# Patient Record
Sex: FEMALE | Race: WHITE
[De-identification: names, ages, dates, MRNs, and addresses within clinical notes are randomized per-mention and may not be internally consistent; named-entity substitution may affect disease eponyms.]

---

## 2020-07-13 ENCOUNTER — HOSPITAL ENCOUNTER (OUTPATIENT)
Dept: HOSPITAL 100 - PSN | Age: 60
Discharge: HOME | End: 2020-07-13
Payer: COMMERCIAL

## 2020-07-13 VITALS — OXYGEN SATURATION: 96 % | HEART RATE: 62 BPM

## 2020-07-13 DIAGNOSIS — R06.00: Primary | ICD-10-CM

## 2020-07-13 PROCEDURE — 94618 PULMONARY STRESS TESTING: CPT

## 2020-07-17 ENCOUNTER — HOSPITAL ENCOUNTER (OUTPATIENT)
Dept: HOSPITAL 100 - CT | Age: 60
Discharge: HOME | End: 2020-07-17
Payer: COMMERCIAL

## 2020-07-17 DIAGNOSIS — R22.0: Primary | ICD-10-CM

## 2020-07-17 LAB
CREATININE FINGERSTICK: 0.9 MG/DL (ref 0.55–1.02)
EGFR FINGERSTICK: > 60 ML/MIN (ref 60–?)

## 2020-07-17 PROCEDURE — 71260 CT THORAX DX C+: CPT

## 2023-12-06 NOTE — PROGRESS NOTES
Cardiology Subsequent Encounter Clinic Note  Name: Bobbi Tinsley  MRN: [id number]  : 1960  CC: [employer]    Active Issues:  Bobbi Tinsley is a 63 y.o. female with a medical history of ACC/AHA stage C HFpEF, hypertension here for the following complaints:     Problem #1 ACC/AHA stage C HFpEF  -Currently on Lasix 40 mg daily     Problem #2 palpitations  -Event monitor was negative for any significant arrhythmias. Continues to have occasional palpitations.     Problem 3 hypertension  -Blood pressure better controlled following initiation of the valsartan. Lisinopril was discontinued due to cough.     Problem #4 atypical chest pain  Stress test -ve 2022.     Currently notes occasional shortness of breath with activity. Denies any orthopnea/PND/does have occasional lower extremity edema. Continues to have episodes of chest discomfort/palpitations associated with dizziness short lasting mostly at rest.  These are mostly described as fairly infrequent and usually when the weather changes.  She describes around 5-10 episodes in the past 2 weeks    Exercise nuclear stress test 2022:  1. Normal myocardial perfusion study without evidence of ischemia or  prior infarction.  2. The left ventricle is normal in size.  3. Normal LV wall motion with an LV EF estimated at greater than 65%.    Echocardiogram 2021:  1. The left ventricular systolic function is normal with a 55-60% estimated ejection fraction.  2. Spectral Doppler shows an impaired relaxation pattern of left ventricular diastolic filling.  3. There is mild aortic valve regurgitation.      Past Medical History  Past Medical History:   Diagnosis Date    Chronic obstructive pulmonary disease, unspecified (CMS/[facility]) 2020    COPD (chronic obstructive pulmonary disease)    Essential (primary) hypertension 2022    Hypertension    Gastro-esophageal reflux disease without esophagitis 2020    GERD (gastroesophageal reflux disease)     Other postherpetic nervous system involvement 07/31/2020    Postherpetic neuralgia    Personal history of other specified conditions 07/10/2020    History of dyspnea    Unspecified asthma, uncomplicated 05/18/2020    Asthma       Past Surgical History  Past Surgical History:   Procedure Laterality Date    OTHER SURGICAL HISTORY  05/18/2020    Cholecystectomy    OTHER SURGICAL HISTORY  05/18/2020    Rectal surgery    OTHER SURGICAL HISTORY  05/18/2020    Colonoscopy    OTHER SURGICAL HISTORY  05/18/2020    Sigmoidectomy       Medications  Current Outpatient Medications on File Prior to Visit   Medication Sig Dispense Refill    cyclobenzaprine (Flexeril) 10 mg tablet Take 1 tablet (10 mg) by mouth once daily as needed.      furosemide (Lasix) 40 mg tablet Take 1 tablet (40 mg) by mouth once daily.      gabapentin (Neurontin) 300 mg capsule Take 1 capsule (300 mg) by mouth twice a day.      hydrOXYzine HCL (Atarax) 25 mg tablet Take 1 tablet (25 mg) by mouth 3 times a day as needed.      L. acidophilus/Bifid. animalis 32 billion cell capsule Take by mouth.      metoprolol succinate XL (Toprol-XL) 50 mg 24 hr tablet Take 1 tablet (50 mg) by mouth once daily.      omeprazole (PriLOSEC) 20 mg DR capsule Take 1 capsule (20 mg) by mouth once daily.      potassium chloride CR 10 mEq ER tablet Take 2 tablets (20 mEq) by mouth.      valsartan (Diovan) 160 mg tablet Take 1 tablet (160 mg) by mouth once daily.      zolpidem (Ambien) 10 mg tablet Take 1 tablet (10 mg) by mouth as needed at bedtime for sleep.       No current facility-administered medications on file prior to visit.       Allergies  Allergies   Allergen Reactions    Acetaminophen Rash and Shortness of breath    Dicyclomine Shortness of breath     Blurred vision    Morphine Anaphylaxis and Shortness of breath     Chest pain and felt like I was having a heart attack    Codeine Hives and Itching    Hyoscyamine Other     Palpitations, blurred vision    Lisinopril  "Cough    Oxycodone Other    Atorvastatin Myalgia, Rash and Unknown    Budesonide-Formoterol Palpitations     Tachycardia    Hydromorphone Hcl Headache and Rash     Jaw tightness       Social History  Social History     Tobacco Use    Smoking status: Never    Smokeless tobacco: Never   Substance Use Topics    Alcohol use: Not Currently    Drug use: Never       Family History  No family history on file.    Physical Examination  Vitals: /74 (BP Location: Left arm, Patient Position: Sitting)   Pulse 80   Ht 1.6 m (5' 3\")   Wt 87.5 kg (193 lb)   SpO2 90%   BMI 34.19 kg/m²   General: awake, alert and oriented. No acute distress.   Skin: Skin is warm, dry and intact without rashes or lesions. Appropriate color for ethnicity. Nail beds pink with no cyanosis or clubbing  HEENT: normocephalic, atraumatic; conjunctivae are clear without exudates or hemorrhage. Sclera is non-icteric. Eyelids are normal in appearance without swelling or lesions. Hearing intact. Nares are patent bilaterally. Moist mucous membranes.   Cardiovascular: Regular. No murmurs, gallops, or rubs are auscultated. S1 and S2 are heard and are of normal intensity. No JVD, no carotid bruits  Respiratory: Thorax symmetric. CTAB, breath sounds vesicular. No crackles, wheezes or ronchi.   Gastrointestinal: soft, non-distended, BS + x 4  Genitourinary: exam deferred  Musculoskeletal: moves all extremities  Extremities: pulses palpable bilaterally; no swelling or erythema; no edema  Neurological: alert & oriented x 3; no focal deficits  Psychiatric: appropriate mood and affect      Impression  Bobbi Tinsley is a 63 y.o. female with a medical history of ACC/AHA stage C HFpEF, hypertension here for the following complaints:     Problem #1 ACC/AHA stage C HFpEF  -Currently on Lasix 40 mg daily  -Appears euvolemic on exam; \"warm and dry\"     Problem #2 palpitations  -Episodes are not attributable to arrhythmias.  Previous 14-day event monitor was " negative.  -Given more events with the weather change I discussed another repeat monitor but the patient wants to hold off at this time     Problem 3 hypertension  -Well-controlled. Continue current medications.     #4 chest discomfort  -Atypical. Negative stress test in June 2022     Plan:-  Has had blood work through her PCP.  Will attempt to get the records from the same  RTC 1 year    Gagan Denise MD  Advanced Heart Failure/Transplant Cardiology  Cardio-Oncology  [facility]

## 2023-12-07 ENCOUNTER — OFFICE VISIT (OUTPATIENT)
Dept: CARDIOLOGY | Facility: CLINIC | Age: 63
End: 2023-12-07
Payer: COMMERCIAL

## 2023-12-07 VITALS
OXYGEN SATURATION: 90 % | HEART RATE: 80 BPM | BODY MASS INDEX: 34.2 KG/M2 | SYSTOLIC BLOOD PRESSURE: 128 MMHG | HEIGHT: 63 IN | DIASTOLIC BLOOD PRESSURE: 74 MMHG | WEIGHT: 193 LBS

## 2023-12-07 DIAGNOSIS — R00.2 PALPITATIONS: ICD-10-CM

## 2023-12-07 DIAGNOSIS — I11.0 BENIGN HYPERTENSIVE HEART DISEASE WITH HEART FAILURE (MULTI): Primary | ICD-10-CM

## 2023-12-07 PROCEDURE — 1159F MED LIST DOCD IN RCRD: CPT | Performed by: STUDENT IN AN ORGANIZED HEALTH CARE EDUCATION/TRAINING PROGRAM

## 2023-12-07 PROCEDURE — 99214 OFFICE O/P EST MOD 30 MIN: CPT | Performed by: STUDENT IN AN ORGANIZED HEALTH CARE EDUCATION/TRAINING PROGRAM

## 2023-12-07 PROCEDURE — 1036F TOBACCO NON-USER: CPT | Performed by: STUDENT IN AN ORGANIZED HEALTH CARE EDUCATION/TRAINING PROGRAM

## 2023-12-07 RX ORDER — OMEPRAZOLE 20 MG/1
20 CAPSULE, DELAYED RELEASE ORAL
COMMUNITY
Start: 2020-05-18

## 2023-12-07 RX ORDER — CYCLOBENZAPRINE HCL 10 MG
10 TABLET ORAL DAILY PRN
COMMUNITY
Start: 2021-02-11

## 2023-12-07 RX ORDER — GABAPENTIN 300 MG/1
300 CAPSULE ORAL 2 TIMES DAILY
COMMUNITY
Start: 2022-07-06 | End: 2024-02-06

## 2023-12-07 RX ORDER — FUROSEMIDE 40 MG/1
40 TABLET ORAL DAILY
COMMUNITY
End: 2024-02-19 | Stop reason: SDUPTHER

## 2023-12-07 RX ORDER — ZOLPIDEM TARTRATE 10 MG/1
10 TABLET ORAL NIGHTLY PRN
COMMUNITY

## 2023-12-07 RX ORDER — POTASSIUM CHLORIDE 750 MG/1
20 TABLET, FILM COATED, EXTENDED RELEASE ORAL
COMMUNITY
Start: 2020-07-31

## 2023-12-07 RX ORDER — HYDROXYZINE HYDROCHLORIDE 25 MG/1
25 TABLET, FILM COATED ORAL 3 TIMES DAILY PRN
COMMUNITY
Start: 2018-11-30

## 2023-12-07 RX ORDER — METOPROLOL SUCCINATE 50 MG/1
50 TABLET, EXTENDED RELEASE ORAL
COMMUNITY
Start: 2022-01-27

## 2023-12-07 RX ORDER — VALSARTAN 160 MG/1
160 TABLET ORAL DAILY
COMMUNITY

## 2024-02-14 ENCOUNTER — TELEPHONE (OUTPATIENT)
Dept: CARDIOLOGY | Facility: CLINIC | Age: 64
End: 2024-02-14
Payer: COMMERCIAL

## 2024-02-14 DIAGNOSIS — I11.0 BENIGN HYPERTENSIVE HEART DISEASE WITH HEART FAILURE (MULTI): ICD-10-CM

## 2024-02-14 NOTE — TELEPHONE ENCOUNTER
Pt called in requesting refill for Furosemide 40mg one tab po daily. Previous prescriber out of office x1 month and pt going on trip soon as well. May I propose a refill to you? If agree, could you please verify amount? Thank you.

## 2024-02-19 DIAGNOSIS — I11.0 BENIGN HYPERTENSIVE HEART DISEASE WITH HEART FAILURE (MULTI): ICD-10-CM

## 2024-02-19 RX ORDER — FUROSEMIDE 40 MG/1
40 TABLET ORAL DAILY
Qty: 30 TABLET | Refills: 5 | Status: SHIPPED | OUTPATIENT
Start: 2024-02-19 | End: 2024-02-19 | Stop reason: SDUPTHER

## 2024-02-23 RX ORDER — FUROSEMIDE 40 MG/1
40 TABLET ORAL DAILY
Qty: 30 TABLET | Refills: 5 | Status: SHIPPED | OUTPATIENT
Start: 2024-03-19 | End: 2024-09-15

## 2024-07-30 DIAGNOSIS — I11.0 BENIGN HYPERTENSIVE HEART DISEASE WITH HEART FAILURE (MULTI): ICD-10-CM

## 2024-07-30 RX ORDER — FUROSEMIDE 40 MG/1
TABLET ORAL
Qty: 90 TABLET | Refills: 1 | Status: SHIPPED | OUTPATIENT
Start: 2024-07-30

## 2024-12-05 ENCOUNTER — APPOINTMENT (OUTPATIENT)
Dept: CARDIOLOGY | Facility: CLINIC | Age: 64
End: 2024-12-05
Payer: COMMERCIAL